# Patient Record
(demographics unavailable — no encounter records)

---

## 2024-10-14 NOTE — PHYSICAL EXAM
[FreeTextEntry1] : Limited as this was a telemedicine visit. Patient AAOx3, calm, cooperative.  diffuse

## 2024-10-14 NOTE — HISTORY OF PRESENT ILLNESS
[FreeTextEntry1] : This visit was conducted via an audiovisual call. Patient confirmed they were at home at [address] [place] [address] . Dr. Galdamez was in the office at [address] [place] [address] . Patient consented to the televisit.  Ms. ANNE-MARIE CAMARGO is a 45 year old female who presents for follow up. She denies any new symptoms. Still complaining of arm pain and swelling. She is getting some relief with the Robaxin.  MRI LUE showed areas of fat necrosis but patient denies any injections to area.    Location: Now only on L upper back/upper arm Onset: Started around 5/2024, just bumped R arm, does not remember exactly Provocation/Palliative: Worst with moving L arm Quality: Achy, dull Radiation: No radiation down RUE at this time, only pain over L upper arm Severity: moderate Timing: Did not improve with PT in terms of L arm pain  No bowel/bladder changes. No groin numbness.

## 2024-10-14 NOTE — ASSESSMENT
[FreeTextEntry1] : Ms. ANNE-MARIE CAMARGO is a 44 year old female who presented with bilateral cervical radicular type pain which did improve with PT but now having more focal L upper arm pain, with significant swelling and tenderness on exam, for which DVT and fracture was ruled out. Pain may be coming from bicep or other arm injury although recent MRI LUE showed areas of fat necrosis although patient denies any injection to area. Denies any red flag signs. Will recommend: - MRI L upper extremity reviewed with patient - May continue Methocarbamol 750mg TID PRN. Advised of side effects including sedation. - Advised follow up with PCP regarding the fat necrosis seen on MRI, for which she agreed.   RTC as needed. Patient aware of red flag signs including any changes to their bowel/bladder control, groin numbness or new weakness. Patient knows to seek immediate attention by calling 911 or going to nearest ER if these symptoms appear.

## 2025-02-04 NOTE — REASON FOR VISIT
[Home] : at home, [unfilled] , at the time of the visit. [Other Location: e.g. Home (Enter Location, City,State)___] : at [unfilled] [Patient] : the patient [Initial Evaluation] : an initial evaluation [DM Type 2] : DM Type 2 [FreeTextEntry2] : [facility] econsult  follow up

## 2025-02-04 NOTE — HISTORY OF PRESENT ILLNESS
[FreeTextEntry1] : TEB start time 112 PM   end time 138pm    eCOnsult  follow up T2DM  severe insulin resitance  pt dx 2019   pt admited to  after pt unable to lower BS  -  BS perisitently over 500 Pt admitted  for mild DKA  was on MFN 1500 mg qd  ( all 3 tab 500 mg in evening)  pt was not sick recently   Was having  varying dose s of Thorazine for past few months      Current Regimen : Lispro 36 unit tid ac Lantus 55-0-0-60   mg bid    Sensor  Does not want sensor    Meter One touch verio Flex    Checks  BS  4 times per day   -110  Lunch 120-140  dinner 120-140  QHS  150-160   Hypoglycemia : Lowest 55-70  pre lunch   when takes Lispro and does not eat breakfst so by lunch she has been  low at times 55-70     Diet   B granola bar or yogurt L [place] on whole wheat bread   D  MAc and Cheese veggie salad  Snacks  NUts  not eating sweets or sweet beverages drinking zero sugar sodas       Exercise Not much     Follow ups   opthalmology will go - overdue    Cardiology  not recently   Podiatry not gone  PMH  T2DM since 2019  Bipolar - sees psychiatry and Therapist  Overactive bladder Hypothyroidism -many yeasr now   on 0.075 mg LT4  PSH CHolecystectomy  No pancreatitis  FH   Pat GF  uncle Dad with DM   Mom Breast cancer with mets Uncle  LUng  Liver cancer    All LVQ  steroids   Soc HX not smoking  no alcohol use    Meds-- on list

## 2025-02-04 NOTE — REASON FOR VISIT
[Home] : at home, [unfilled] , at the time of the visit. [Other Location: e.g. Home (Enter Location, City,State)___] : at [unfilled] [Patient] : the patient [Initial Evaluation] : an initial evaluation [DM Type 2] : DM Type 2 [FreeTextEntry2] : [facility] econsult  follow up

## 2025-02-04 NOTE — ASSESSMENT
[FreeTextEntry1] : T2DM  s/p Mild DKA with severe insulin resistance  Pt BS under good control with insulin and MFN   cont RX  pt does not want  delmar Calzada et up RDCDE r/o [place]  ? severe insulin resitance in hospital due to Increased dose of Thorazine   for low BS-- can take lispro after breakfast to make sure she eats enough   take Lispro before Lunch ANd Dinner   Am nausea-- see GI - has one  - calzadanorah sierra appt   Opthalmology--will braydon visit   Hypothyroid cont RX wiht 0.075 mg qd  LT$

## 2025-02-04 NOTE — HISTORY OF PRESENT ILLNESS
[FreeTextEntry1] : TEB start time 112 PM   end time 138pm    eCOnsult  follow up T2DM  severe insulin resitance  pt dx 2019   pt admited to  after pt unable to lower BS  -  BS perisitently over 500 Pt admitted  for mild DKA  was on MFN 1500 mg qd  ( all 3 tab 500 mg in evening)  pt was not sick recently   Was having  varying dose s of Thorazine for past few months      Current Regimen : Lispro 36 unit tid ac Lantus 55-0-0-60   mg bid    Sensor  Does not want sensor    Meter One touch verio Flex    Checks  BS  4 times per day   -110  Lunch 120-140  dinner 120-140  QHS  150-160   Hypoglycemia : Lowest 55-70  pre lunch   when takes Lispro and does not eat breakfst so by lunch she has been  low at times 55-70     Diet   B granola bar or yogurt L [place] on whole wheat bread   D  MAc and Cheese veggie salad  Snacks  NUts  not eating sweets or sweet beverages drinking zero sugar sodas       Exercise Not much     Follow ups   opthalmology will go - overdue    Cardiology  not recently   Podiatry not gone  PMH  T2DM since 2019  Bipolar - sees psychiatry and Therapist  Overactive bladder Hypothyroidism -many yeasr now   on 0.075 mg LT4  PSH CHolecystectomy  No pancreatitis  FH   Pat GF  uncle Dad with DM   Mom Breast cancer with mets Uncle  LUng  Liver cancer    All LVQ  steroids   Soc HX not smoking  no alcohol use    Meds-- on list

## 2025-06-04 NOTE — HISTORY OF PRESENT ILLNESS
[FreeTextEntry1] : Ms. ANNE-MARIE CAMARGO is a 45 year old female who presents for follow up. At last visit in 10/2024, she was continued on Robaxin PRN and advised to f/u with PCP.    Location: Now only on L upper back/upper arm Onset: Started around 5/2024, just bumped R arm, does not remember exactly Provocation/Palliative: Worst with moving L arm Quality: Achy, dull Radiation: No radiation down RUE at this time, only pain over L upper arm Severity: moderate Timing: Did not improve with PT in terms of L arm pain  No bowel/bladder changes. No groin numbness.

## 2025-06-04 NOTE — PHYSICAL EXAM
[FreeTextEntry1] : PE: Constitutional: In NAD, calm and cooperative MSK:  Inspection: significant swelling of L upper arm compared to R upper arm, no discoloration noted  Palpation: No TTP over neck/upper back, significant TTP over L upper arm  ROM: AROM of C Spine intact, painless  Strength: 5/5 strength in bilateral upper extremities  Reflexes: 2+ Biceps/Brachioradialis/Triceps/patella/Achilles reflex bilaterally, Hoffmans/Clonus negative bilaterally  Sensation: Intact to light touch in bilateral upper extremities  Special tests: Spurlings test negative bilaterally.

## 2025-07-28 NOTE — ASSESSMENT
[FreeTextEntry1] : 45 yo woman with PMHX of hypothyroidism, anemia, HTN, fibroids, hypercholesterolemia, and anxiety presents with congestion.  She states that she cannot breather from her right nostril and this is triggering her anxiety.  She was even seen in the ED and sent home.  She is not having any issue breathing.  Occasional cough.  No headache, neck stiffness, light sensitivity, rash, chest pain or SOB.  No nausea or vomiting.  She has only taken fluticasone nasal spray and Afrin with only temporary improvement after which she feels worse.    Assessment:  Nasal congestion

## 2025-07-28 NOTE — HISTORY OF PRESENT ILLNESS
[Home] : at home, [unfilled] , at the time of the visit. [Other Location: e.g. Home (Enter Location, City,State)___] : at [unfilled] [Telehealth (audio & video)] : This visit was provided via telehealth using real-time 2-way audio visual technology. [Verbal consent obtained from patient] : the patient, [unfilled] [FreeTextEntry8] : 47 yo woman with PMHX of hypothyroidism, anemia, HTN, fibroids, hypercholesterolemia, and anxiety presents with congestion.  She states that she cannot breather from her right nostril and this is triggering her anxiety.  She was even seen in the ED and sent home.  She is not having any issue breathing.  Occasional cough.  No headache, neck stiffness, light sensitivity, rash, chest pain or SOB.  No nausea or vomiting.  She has only taken fluticasone nasal spray and Afrin with only temporary improvement after which she feels worse.

## 2025-07-28 NOTE — PHYSICAL EXAM
[No Acute Distress] : no acute distress [Well Nourished] : well nourished [Well Developed] : well developed [Well-Appearing] : well-appearing [Normal Sclera/Conjunctiva] : normal sclera/conjunctiva [Supple] : supple [No Respiratory Distress] : no respiratory distress  [No Accessory Muscle Use] : no accessory muscle use [No Rash] : no rash [Speech Grossly Normal] : speech grossly normal [Alert and Oriented x3] : oriented to person, place, and time [Normal Insight/Judgement] : insight and judgment were intact [de-identified] : nasal congestion present [de-identified] : anxious

## 2025-07-28 NOTE — PLAN
[FreeTextEntry1] : PLAN:  Stop everything you are currently using. saline nasal spray to both nostrils until clear. you will be ok. You have a cold. Any new or worsening especially shortness of breath will need an emergency department evaluation.